# Patient Record
Sex: FEMALE | Race: WHITE | Employment: FULL TIME | URBAN - METROPOLITAN AREA
[De-identification: names, ages, dates, MRNs, and addresses within clinical notes are randomized per-mention and may not be internally consistent; named-entity substitution may affect disease eponyms.]

---

## 2017-05-21 ENCOUNTER — HOSPITAL ENCOUNTER (EMERGENCY)
Age: 42
Discharge: HOME | End: 2017-05-21

## 2017-05-21 VITALS
TEMPERATURE: 98.8 F | HEIGHT: 63 IN | WEIGHT: 155 LBS | SYSTOLIC BLOOD PRESSURE: 157 MMHG | HEART RATE: 74 BPM | DIASTOLIC BLOOD PRESSURE: 90 MMHG | BODY MASS INDEX: 27.46 KG/M2 | OXYGEN SATURATION: 97 % | RESPIRATION RATE: 17 BRPM

## 2017-05-21 DIAGNOSIS — J01.00 ACUTE NON-RECURRENT MAXILLARY SINUSITIS: Primary | ICD-10-CM

## 2017-05-21 PROCEDURE — 6370000000 HC RX 637 (ALT 250 FOR IP): Performed by: PHYSICIAN ASSISTANT

## 2017-05-21 PROCEDURE — 99282 EMERGENCY DEPT VISIT SF MDM: CPT

## 2017-05-21 RX ORDER — AMOXICILLIN AND CLAVULANATE POTASSIUM 875; 125 MG/1; MG/1
1 TABLET, FILM COATED ORAL ONCE
Status: COMPLETED | OUTPATIENT
Start: 2017-05-21 | End: 2017-05-21

## 2017-05-21 RX ORDER — AMOXICILLIN AND CLAVULANATE POTASSIUM 875; 125 MG/1; MG/1
1 TABLET, FILM COATED ORAL 2 TIMES DAILY
Qty: 20 TABLET | Refills: 0 | Status: SHIPPED | OUTPATIENT
Start: 2017-05-21 | End: 2017-05-31

## 2017-05-21 RX ADMIN — AMOXICILLIN AND CLAVULANATE POTASSIUM 1 TABLET: 875; 125 TABLET, FILM COATED ORAL at 15:34

## 2017-05-21 ASSESSMENT — ENCOUNTER SYMPTOMS
APNEA: 0
EYE PAIN: 0
ALLERGIC/IMMUNOLOGIC NEGATIVE: 1
COLOR CHANGE: 0
SHORTNESS OF BREATH: 0
ABDOMINAL PAIN: 0
TROUBLE SWALLOWING: 0

## 2017-10-24 ENCOUNTER — HOSPITAL ENCOUNTER (EMERGENCY)
Age: 42
Discharge: HOME | End: 2017-10-24

## 2017-10-24 ENCOUNTER — APPOINTMENT (OUTPATIENT)
Dept: GENERAL RADIOLOGY | Age: 42
End: 2017-10-24

## 2017-10-24 ENCOUNTER — APPOINTMENT (OUTPATIENT)
Dept: ULTRASOUND IMAGING | Age: 42
End: 2017-10-24

## 2017-10-24 VITALS
TEMPERATURE: 98.4 F | DIASTOLIC BLOOD PRESSURE: 88 MMHG | HEART RATE: 55 BPM | OXYGEN SATURATION: 100 % | SYSTOLIC BLOOD PRESSURE: 151 MMHG | BODY MASS INDEX: 25.34 KG/M2 | RESPIRATION RATE: 12 BRPM | HEIGHT: 63 IN | WEIGHT: 143 LBS

## 2017-10-24 DIAGNOSIS — S39.012A BACK STRAIN, INITIAL ENCOUNTER: Primary | ICD-10-CM

## 2017-10-24 DIAGNOSIS — N39.0 URINARY TRACT INFECTION WITHOUT HEMATURIA, SITE UNSPECIFIED: ICD-10-CM

## 2017-10-24 LAB
ALBUMIN SERPL-MCNC: 4.1 G/DL (ref 3.9–4.9)
ALP BLD-CCNC: 65 U/L (ref 40–130)
ALT SERPL-CCNC: 13 U/L (ref 0–33)
ANION GAP SERPL CALCULATED.3IONS-SCNC: 12 MEQ/L (ref 7–13)
AST SERPL-CCNC: 11 U/L (ref 0–35)
BILIRUB SERPL-MCNC: 0.1 MG/DL (ref 0–1.2)
BILIRUBIN URINE: NEGATIVE
BLOOD, URINE: ABNORMAL
BUN BLDV-MCNC: 13 MG/DL (ref 6–20)
CALCIUM SERPL-MCNC: 9 MG/DL (ref 8.6–10.2)
CHLORIDE BLD-SCNC: 106 MEQ/L (ref 98–107)
CLARITY: CLEAR
CO2: 21 MEQ/L (ref 22–29)
COLOR: YELLOW
CREAT SERPL-MCNC: 0.54 MG/DL (ref 0.5–0.9)
EPITHELIAL CELLS, UA: ABNORMAL /HPF
GFR AFRICAN AMERICAN: >60
GFR NON-AFRICAN AMERICAN: >60
GLOBULIN: 2.7 G/DL (ref 2.3–3.5)
GLUCOSE BLD-MCNC: 91 MG/DL (ref 74–109)
GLUCOSE URINE: NEGATIVE MG/DL
HCT VFR BLD CALC: 41.1 % (ref 37–47)
HEMOGLOBIN: 14.1 G/DL (ref 12–16)
KETONES, URINE: NEGATIVE MG/DL
LEUKOCYTE ESTERASE, URINE: ABNORMAL
LIPASE: 50 U/L (ref 13–60)
MCH RBC QN AUTO: 33.2 PG (ref 27–31.3)
MCHC RBC AUTO-ENTMCNC: 34.2 % (ref 33–37)
MCV RBC AUTO: 97.1 FL (ref 82–100)
MUCUS: PRESENT
NITRITE, URINE: NEGATIVE
PDW BLD-RTO: 12.6 % (ref 11.5–14.5)
PH UA: 5.5 (ref 5–9)
PLATELET # BLD: 271 K/UL (ref 130–400)
POTASSIUM SERPL-SCNC: 4.3 MEQ/L (ref 3.5–5.1)
PROTEIN UA: NEGATIVE MG/DL
RBC # BLD: 4.23 M/UL (ref 4.2–5.4)
RBC UA: ABNORMAL /HPF (ref 0–2)
SODIUM BLD-SCNC: 139 MEQ/L (ref 132–144)
SPECIFIC GRAVITY UA: 1.02 (ref 1–1.03)
TOTAL PROTEIN: 6.8 G/DL (ref 6.4–8.1)
URINE REFLEX TO CULTURE: YES
UROBILINOGEN, URINE: 0.2 E.U./DL
WBC # BLD: 8.2 K/UL (ref 4.8–10.8)
WBC UA: ABNORMAL /HPF (ref 0–5)

## 2017-10-24 PROCEDURE — 72110 X-RAY EXAM L-2 SPINE 4/>VWS: CPT

## 2017-10-24 PROCEDURE — 85027 COMPLETE CBC AUTOMATED: CPT

## 2017-10-24 PROCEDURE — 76705 ECHO EXAM OF ABDOMEN: CPT

## 2017-10-24 PROCEDURE — 80053 COMPREHEN METABOLIC PANEL: CPT

## 2017-10-24 PROCEDURE — 83690 ASSAY OF LIPASE: CPT

## 2017-10-24 PROCEDURE — 87086 URINE CULTURE/COLONY COUNT: CPT

## 2017-10-24 PROCEDURE — 96375 TX/PRO/DX INJ NEW DRUG ADDON: CPT

## 2017-10-24 PROCEDURE — 6360000002 HC RX W HCPCS: Performed by: PHYSICIAN ASSISTANT

## 2017-10-24 PROCEDURE — 96374 THER/PROPH/DIAG INJ IV PUSH: CPT

## 2017-10-24 PROCEDURE — 99284 EMERGENCY DEPT VISIT MOD MDM: CPT

## 2017-10-24 PROCEDURE — 81001 URINALYSIS AUTO W/SCOPE: CPT

## 2017-10-24 PROCEDURE — 36415 COLL VENOUS BLD VENIPUNCTURE: CPT

## 2017-10-24 RX ORDER — NITROFURANTOIN 25; 75 MG/1; MG/1
100 CAPSULE ORAL 2 TIMES DAILY
Qty: 10 CAPSULE | Refills: 0 | Status: SHIPPED | OUTPATIENT
Start: 2017-10-24 | End: 2017-10-29

## 2017-10-24 RX ORDER — KETOROLAC TROMETHAMINE 30 MG/ML
30 INJECTION, SOLUTION INTRAMUSCULAR; INTRAVENOUS ONCE
Status: COMPLETED | OUTPATIENT
Start: 2017-10-24 | End: 2017-10-24

## 2017-10-24 RX ORDER — CYCLOBENZAPRINE HCL 10 MG
10 TABLET ORAL 3 TIMES DAILY PRN
Qty: 21 TABLET | Refills: 0 | Status: SHIPPED | OUTPATIENT
Start: 2017-10-24

## 2017-10-24 RX ORDER — ONDANSETRON 2 MG/ML
4 INJECTION INTRAMUSCULAR; INTRAVENOUS ONCE
Status: COMPLETED | OUTPATIENT
Start: 2017-10-24 | End: 2017-10-24

## 2017-10-24 RX ORDER — NAPROXEN 500 MG/1
500 TABLET ORAL 2 TIMES DAILY WITH MEALS
Qty: 30 TABLET | Refills: 0 | Status: SHIPPED | OUTPATIENT
Start: 2017-10-24

## 2017-10-24 RX ADMIN — ONDANSETRON 4 MG: 2 INJECTION INTRAMUSCULAR; INTRAVENOUS at 08:33

## 2017-10-24 RX ADMIN — KETOROLAC TROMETHAMINE 30 MG: 30 INJECTION, SOLUTION INTRAMUSCULAR at 08:33

## 2017-10-24 ASSESSMENT — ENCOUNTER SYMPTOMS
VOMITING: 0
RHINORRHEA: 0
NAUSEA: 1
ABDOMINAL PAIN: 1
CONSTIPATION: 0
COUGH: 0
EYE DISCHARGE: 0
DIARRHEA: 0
BACK PAIN: 1
COLOR CHANGE: 0
CHOKING: 0
WHEEZING: 0
STRIDOR: 0
ABDOMINAL DISTENTION: 0
SHORTNESS OF BREATH: 0
SORE THROAT: 0

## 2017-10-24 ASSESSMENT — PAIN DESCRIPTION - ORIENTATION: ORIENTATION: LOWER;RIGHT

## 2017-10-24 ASSESSMENT — PAIN DESCRIPTION - DESCRIPTORS: DESCRIPTORS: CRAMPING

## 2017-10-24 ASSESSMENT — PAIN SCALES - GENERAL
PAINLEVEL_OUTOF10: 4
PAINLEVEL_OUTOF10: 5
PAINLEVEL_OUTOF10: 0

## 2017-10-24 ASSESSMENT — PAIN SCALES - WONG BAKER: WONGBAKER_NUMERICALRESPONSE: 0

## 2017-10-24 ASSESSMENT — PAIN DESCRIPTION - LOCATION: LOCATION: ABDOMEN;BACK

## 2017-10-24 ASSESSMENT — PAIN DESCRIPTION - PAIN TYPE: TYPE: ACUTE PAIN

## 2017-10-24 NOTE — ED PROVIDER NOTES
[facility] ED  eMERGENCY dEPARTMENT eNCOUnter      Pt Name: Julianne Schmidt  MRN: [id number]  [place] 1975  Date of evaluation: 10/24/2017  Provider: Elan Lagunas PA-C    CHIEF COMPLAINT       Chief Complaint   Patient presents with    Back Pain     lower back and right sided lower abd pain since last night, no BM since friday, N/V this morning at 7am, Hx of cholitis 5 years ago         HISTORY OF PRESENT ILLNESS   (Location/Symptom, Timing/Onset, Context/Setting, Quality, Duration, Modifying Factors, Severity)  Note limiting factors. Julianne Schmidt is a 46 y.o. female who presents to the emergency department With a complaint of right upper quadrant abdominal pain as well as bilateral paraspinal lumbar pain which patient states been ongoing since early this morning. She states she has not had any bowel movement in the last 3 or 4 days which is unusual for her she did complain of some increased pain yesterday after eating dinner with her last meal being at 7 PM last night. She denies any acute injury no numbness or tingling or difficulty with ambulation. She during her current pain as a 5 out of 10. HPI    Nursing Notes were reviewed. REVIEW OF SYSTEMS    (2-9 systems for level 4, 10 or more for level 5)     Review of Systems   Constitutional: Negative for activity change, appetite change, chills, fatigue, fever and unexpected weight change. HENT: Negative for congestion, ear discharge, ear pain, nosebleeds, rhinorrhea and sore throat. Eyes: Negative for discharge. Respiratory: Negative for cough, choking, shortness of breath, wheezing and stridor. Cardiovascular: Negative for chest pain, palpitations and leg swelling. Gastrointestinal: Positive for abdominal pain and nausea. Negative for abdominal distention, constipation, diarrhea and vomiting. Genitourinary: Negative for difficulty urinating, dysuria, flank pain and frequency.    Musculoskeletal: Positive for back pain. Negative for arthralgias, gait problem, joint swelling, myalgias, neck pain and neck stiffness. Skin: Negative for color change, pallor, rash and wound. Neurological: Negative for dizziness, tremors, syncope, weakness, numbness and headaches. Psychiatric/Behavioral: Negative for agitation and confusion. Except as noted above the remainder of the review of systems was reviewed and negative. PAST MEDICAL HISTORY   History reviewed. No pertinent past medical history. SURGICAL HISTORY       Past Surgical History:   Procedure Laterality Date    TUBAL LIGATION           CURRENT MEDICATIONS       Previous Medications    No medications on file       ALLERGIES     Bee venom    FAMILY HISTORY     History reviewed. No pertinent family history. SOCIAL HISTORY       Social History     Social History    Marital status: Single     Spouse name: N/A    Number of children: N/A    Years of education: N/A     Social History Main Topics    Smoking status: Current Every Day Smoker     Packs/day: 0.50     Years: 20.00     Types: Cigarettes    Smokeless tobacco: Never Used    Alcohol use Yes      Comment: occasional    Drug use:      Types: Marijuana    Sexual activity: Not Asked     Other Topics Concern    None     Social History Narrative    None       SCREENINGS             PHYSICAL EXAM    (up to 7 for level 4, 8 or more for level 5)     ED Triage Vitals [10/24/17 0802]   BP Temp Temp Source Pulse Resp SpO2 Height Weight   (!) 151/87 98.4 °F (36.9 °C) Oral 70 16 99 % 5' 3\" (1.6 m) 143 lb (64.9 kg)       Physical Exam   Constitutional: She is oriented to person, place, and time. She appears well-developed and well-nourished. HENT:   Head: Normocephalic. Eyes: Pupils are equal, round, and reactive to light. Neck: Normal range of motion. Neck supple. No JVD present. No tracheal deviation present. Cardiovascular: Normal rate. Pulmonary/Chest: Effort normal. No respiratory distress. She has no wheezes. She has no rales. She exhibits no tenderness. Abdominal: Soft. She exhibits no distension and no mass. There is tenderness. There is no guarding. Tenderness examination to right upper quadrant. No guarding mass or rebound. Musculoskeletal: She exhibits no edema or deformity. No CVA tenderness no pain palpation to lumbar spine, no crepitus no step-offs   Neurological: She is alert and oriented to person, place, and time. Coordination normal.   Skin: Skin is warm and dry. Psychiatric: She has a normal mood and affect. DIAGNOSTIC RESULTS     EKG: All EKG's are interpreted by the Emergency Department Physician who either signs or Co-signs this chart in the absence of a cardiologist.        RADIOLOGY:   Non-plain film images such as CT, Ultrasound and MRI are read by the radiologist. [facility] radiographic images are visualized and preliminarily interpreted by the emergency physician with the below findings:    X-ray lumbar spine shows no acute fractures or subluxation there is moderate amount of constipation within the bowel. Interpretation per the Radiologist below, if available at the time of this note:    US ABDOMINAL LIMITED   Final Result      Probable left hepatic cavernous hemangioma.             XR LUMBAR SPINE (MIN 4 VIEWS)   Final Result            ED BEDSIDE ULTRASOUND:   Performed by ED Physician - none    LABS:  Labs Reviewed   COMPREHENSIVE METABOLIC PANEL - Abnormal; Notable for the following:        Result Value    CO2 21 (*)     All other components within normal limits   CBC - Abnormal; Notable for the following:     MCH 33.2 (*)     All other components within normal limits   URINE RT REFLEX TO CULTURE - Abnormal; Notable for the following:     Blood, Urine LARGE (*)     Leukocyte Esterase, Urine MODERATE (*)     All other components within normal limits   MICROSCOPIC URINALYSIS - Abnormal; Notable for the following:     WBC, UA 10-20 (*)     RBC, UA  (*) All other components within normal limits   URINE CULTURE   LIPASE       All other labs were within normal range or not returned as of this dictation. EMERGENCY DEPARTMENT COURSE and DIFFERENTIAL DIAGNOSIS/MDM:   Vitals:    Vitals:    10/24/17 0802 10/24/17 0950   BP: (!) 151/87 (!) 151/88   Pulse: 70 55   Resp: 16 12   Temp: 98.4 °F (36.9 °C)    TempSrc: Oral    SpO2: 99% 100%   Weight: 143 lb (64.9 kg)    Height: 5' 3\" (1.6 m)          ED Course      MDM  Number of Diagnoses or Management Options  Back strain, initial encounter:   Urinary tract infection without hematuria, site unspecified:   Diagnosis management comments: Patient uncomfortable during her visit here x-ray lumbar spine shows no acute fracture subluxation no acute degenerative changes are noted. A consult was completed due to right upper quadrant abdominal pain which shows no acute signs of cholelithiasis. Urinalysis shows signs of urinary tract infection at this time she'll be treated as a lumbar sprain and also put on antibiotics for urinary tract infection and advised to follow up with her regular family physician in the next 48 hours. CRITICAL CARE TIME   Total Critical Care time was 0 minutes, excluding separately reportable procedures. There was a high probability of clinically significant/life threatening deterioration in the patient's condition which required my urgent intervention. CONSULTS:  None    PROCEDURES:  Unless otherwise noted below, none     Procedures    FINAL IMPRESSION      1. Back strain, initial encounter    2.  Urinary tract infection without hematuria, site unspecified          DISPOSITION/PLAN   DISPOSITION Decision to Discharge    PATIENT REFERRED TO:  Lane Sanchez MD  [address]  [phone]    In 2 days        DISCHARGE MEDICATIONS:  New Prescriptions    CYCLOBENZAPRINE (FLEXERIL) 10 MG TABLET    Take 1 tablet by mouth 3 times daily as needed for Muscle spasms NAPROXEN (NAPROSYN) 500 MG TABLET    Take 1 tablet by mouth 2 times daily (with meals)    NITROFURANTOIN, MACROCRYSTAL-MONOHYDRATE, (MACROBID) 100 MG CAPSULE    Take 1 capsule by mouth 2 times daily for 5 days     Attestation: The Prescription Monitoring Report for this patient was reviewed today.  (Flash Blanchard PA-C)    (Please note that portions of this note were completed with a voice recognition program.  Efforts were made to edit the dictations but occasionally words are mis-transcribed.)    Flash Blanchard PA-C (electronically signed)  Attending Emergency Physician         Flash Blanchard PA-C  10/24/17 1652 [facility] Tina Grady PA-C  10/24/17 1224 [address] [place] [address] Zenaidamargarita Grady PA-C  10/24/17 1224 [address] [place] [address] Zenaidamargarita Grady PA-C  10/24/17 1013

## 2017-10-24 NOTE — ED NOTES
Pt reports intermittent right sided abdominal and back pain that is cramping in nature. States woke her from sleep last night. Resps even and non labored. Skin p/w/d. +2 radials. Mcuous membranes pink and moist. Cap refill < 3 sec. DUMONT x 4. Aware of plan of care. Awaits US.      Carolyne Dominguez RN  10/24/17 2243

## 2017-10-25 LAB — URINE CULTURE, ROUTINE: NORMAL

## 2019-09-05 ENCOUNTER — HOSPITAL ENCOUNTER (EMERGENCY)
Age: 44
Discharge: HOME | End: 2019-09-05

## 2019-09-05 VITALS
HEART RATE: 62 BPM | DIASTOLIC BLOOD PRESSURE: 57 MMHG | TEMPERATURE: 98.2 F | OXYGEN SATURATION: 100 % | BODY MASS INDEX: 28.16 KG/M2 | HEIGHT: 62 IN | WEIGHT: 153 LBS | RESPIRATION RATE: 18 BRPM | SYSTOLIC BLOOD PRESSURE: 119 MMHG

## 2019-09-05 DIAGNOSIS — T78.40XA ALLERGIC STATE, INITIAL ENCOUNTER: Primary | ICD-10-CM

## 2019-09-05 PROCEDURE — 99282 EMERGENCY DEPT VISIT SF MDM: CPT

## 2019-09-05 RX ORDER — LORATADINE 10 MG/1
10 TABLET ORAL DAILY
Qty: 7 TABLET | Refills: 0 | Status: SHIPPED | OUTPATIENT
Start: 2019-09-05 | End: 2019-09-12

## 2019-09-06 ASSESSMENT — ENCOUNTER SYMPTOMS
EYE DISCHARGE: 1
CONSTIPATION: 0
SORE THROAT: 0
EYE REDNESS: 1
COLOR CHANGE: 0
EYE ITCHING: 1
RHINORRHEA: 0
ABDOMINAL DISTENTION: 0
ABDOMINAL PAIN: 0
SHORTNESS OF BREATH: 0

## 2019-09-06 NOTE — ED PROVIDER NOTES
1500]   BP Temp Temp Source Pulse Resp SpO2 Height Weight   (!) 119/57 98.2 °F (36.8 °C) Oral 62 18 100 % 5' 2\" (1.575 m) 153 lb (69.4 kg)       Physical Exam   Constitutional: She is oriented to person, place, and time. She appears well-developed and well-nourished. HENT:   Head: Normocephalic. Eyes: Pupils are equal, round, and reactive to light. EOM are normal.   Bilateral scleral injection. No discharge no drainage no signs of corneal injury. Neck: Normal range of motion. Neck supple. No JVD present. No tracheal deviation present. Cardiovascular: Normal rate. Pulmonary/Chest: Effort normal and breath sounds normal. No respiratory distress. She has no wheezes. She has no rales. She exhibits no tenderness. Abdominal: Soft. Bowel sounds are normal. She exhibits no distension and no mass. There is no tenderness. There is no guarding. Musculoskeletal: Normal range of motion. She exhibits no edema or deformity. Neurological: She is alert and oriented to person, place, and time. Coordination normal.   Skin: Skin is warm. Psychiatric: She has a normal mood and affect. DIAGNOSTIC RESULTS     EKG: All EKG's are interpreted by the Emergency Department Physician who either signs or Co-signsthis chart in the absence of a cardiologist.        RADIOLOGY:   Luis Chichi such as CT, Ultrasound and MRI are read by the radiologist. Plain radiographic images are visualized and preliminarily interpreted by the emergency physician with the below findings:        Interpretation per the Radiologist below, if available at the time ofthis note:    No orders to display         ED BEDSIDE ULTRASOUND:   Performed by ED Physician - none    LABS:  Labs Reviewed - No data to display    All other labs were within normal range or not returned as of this dictation.     EMERGENCY DEPARTMENT COURSE and DIFFERENTIAL DIAGNOSIS/MDM:   Vitals:    Vitals:    09/05/19 1500   BP: (!) 119/57   Pulse: 62   Resp: 18   Temp: 98.2 °F (36.8 °C)   TempSrc: Oral   SpO2: 100%   Weight: 153 lb (69.4 kg)   Height: 5' 2\" (1.575 m)            MDM  Number of Diagnoses or Management Options  Allergic state, initial encounter:   Diagnosis management comments: She has irritation bilateral eyes which patient states started this morning for her she does have some scleral injection, symptoms are most consistent with that of environmental allergies. She was given a prescription for Claritin and advised to follow-up with her regular family physician next 2 to 3 days if she has any continued or worsening symptoms. She was also advised to return to the emergency department if she has any worsening or change in symptoms. CRITICAL CARE TIME   Total Critical Care time was 0 minutes, excluding separately reportableprocedures. There was a high probability of clinicallysignificant/life threatening deterioration in the patient's condition which required my urgent intervention. CONSULTS:  None    PROCEDURES:  Unless otherwise noted below, none     Procedures    FINAL IMPRESSION      1.  Allergic state, initial encounter          DISPOSITION/PLAN   DISPOSITION Decision To Discharge 09/05/2019 03:20:46 PM      PATIENT REFERRED TO:  [facility]  [address] [place] [address]  [phone]  In 3 days        DISCHARGE MEDICATIONS:  Discharge Medication List as of 9/5/2019  3:22 PM      START taking these medications    Details   loratadine (CLARITIN) 10 MG tablet Take 1 tablet by mouth daily for 7 days, Disp-7 tablet, R-0Print                (Please note that portions of this note were completed with a voice recognition program.  Efforts were made to edit the dictations but occasionally words are mis-transcribed.)    Carmen Burton PA-C (electronically signed)  Attending Emergency Physician         Carmen Burton PA-C  09/06/19 6439

## 2021-12-30 ENCOUNTER — VIRTUAL VISIT (OUTPATIENT)
Dept: FAMILY MEDICINE CLINIC | Age: 46
End: 2021-12-30

## 2021-12-30 DIAGNOSIS — Z20.822 ENCOUNTER BY TELEHEALTH FOR SUSPECTED COVID-19: Primary | ICD-10-CM

## 2021-12-30 PROCEDURE — 99213 OFFICE O/P EST LOW 20 MIN: CPT | Performed by: PHYSICIAN ASSISTANT

## 2021-12-30 PROCEDURE — 99000 SPECIMEN HANDLING OFFICE-LAB: CPT | Performed by: PHYSICIAN ASSISTANT

## 2021-12-30 SDOH — ECONOMIC STABILITY: FOOD INSECURITY: WITHIN THE PAST 12 MONTHS, THE FOOD YOU BOUGHT JUST DIDN'T LAST AND YOU DIDN'T HAVE MONEY TO GET MORE.: NEVER TRUE

## 2021-12-30 SDOH — ECONOMIC STABILITY: FOOD INSECURITY: WITHIN THE PAST 12 MONTHS, YOU WORRIED THAT YOUR FOOD WOULD RUN OUT BEFORE YOU GOT MONEY TO BUY MORE.: NEVER TRUE

## 2021-12-30 ASSESSMENT — PATIENT HEALTH QUESTIONNAIRE - PHQ9
SUM OF ALL RESPONSES TO PHQ QUESTIONS 1-9: 0
2. FEELING DOWN, DEPRESSED OR HOPELESS: 0
SUM OF ALL RESPONSES TO PHQ9 QUESTIONS 1 & 2: 0
SUM OF ALL RESPONSES TO PHQ QUESTIONS 1-9: 0
1. LITTLE INTEREST OR PLEASURE IN DOING THINGS: 0
SUM OF ALL RESPONSES TO PHQ QUESTIONS 1-9: 0

## 2021-12-30 ASSESSMENT — ENCOUNTER SYMPTOMS
VOMITING: 0
SINUS PRESSURE: 0
BACK PAIN: 0
DIARRHEA: 0
SORE THROAT: 1
SHORTNESS OF BREATH: 0
NAUSEA: 0
SINUS PAIN: 0
CHEST TIGHTNESS: 0
ABDOMINAL PAIN: 0
COUGH: 0

## 2021-12-30 ASSESSMENT — SOCIAL DETERMINANTS OF HEALTH (SDOH): HOW HARD IS IT FOR YOU TO PAY FOR THE VERY BASICS LIKE FOOD, HOUSING, MEDICAL CARE, AND HEATING?: NOT VERY HARD

## 2021-12-30 NOTE — PROGRESS NOTES
2021    TELEHEALTH EVALUATION -- Audio/Visual (During [id number] public health emergency)    Due to COVID 19 outbreak, patient's office visit was converted to a virtual visit. Patient was contacted and agreed to proceed with a virtual visit via [email or web address] me  The risks and benefits of converting to a virtual visit were discussed in light of the current infectious disease epidemic. Patient also understood that insurance coverage and co-pays are up to their individual insurance plans. HPI:    Mario Mcgrath (:  1975) has requested an audio/video evaluation for the following concern(s):    Patient is presenting today with CC of COVID-19 symptoms that started today. Fevers/Chills- 101F. Patient took tylenol this morning. Congestion  Sore throat- Still eating and drinking. Body aches/headahes    Patient's sister tested positive COVID-19. 21. Review of Systems   Constitutional: Positive for chills and fever. Negative for activity change and appetite change. HENT: Positive for sore throat. Negative for congestion, drooling, sinus pressure and sinus pain. Eyes: Negative for visual disturbance. Respiratory: Negative for cough, chest tightness and shortness of breath. Cardiovascular: Negative for chest pain. Gastrointestinal: Negative for abdominal pain, diarrhea, nausea and vomiting. Endocrine: Negative for cold intolerance. Genitourinary: Negative for dysuria, flank pain, frequency and hematuria. Musculoskeletal: Positive for myalgias. Negative for arthralgias and back pain. Skin: Negative for rash. Allergic/Immunologic: Negative for food allergies. Neurological: Positive for headaches. Negative for weakness, light-headedness and numbness. Hematological: Does not bruise/bleed easily. Prior to Visit Medications    Medication Sig Taking?  Authorizing Provider   naproxen (NAPROSYN) 500 MG tablet Take 1 tablet by mouth 2 times daily (with meals)  Patient not taking: to authenticate this note. --GREG Dawson on 12/30/2021 at 12:14 PM        Pursuant to the emergency declaration under the [address] [place] [address], [facility] waiver authority and the [employer] Act, this Virtual  Visit was conducted, with patient's consent, to reduce the patient's risk of exposure to COVID-19 and provide continuity of care for an established patient. Services were provided through a video synchronous discussion virtually to substitute for in-person clinic visit.

## 2022-06-14 ENCOUNTER — HOSPITAL ENCOUNTER (EMERGENCY)
Age: 47
Discharge: HOME | End: 2022-06-14
Attending: EMERGENCY MEDICINE
Payer: COMMERCIAL

## 2022-06-14 VITALS
BODY MASS INDEX: 25.69 KG/M2 | RESPIRATION RATE: 18 BRPM | SYSTOLIC BLOOD PRESSURE: 125 MMHG | WEIGHT: 145 LBS | HEIGHT: 63 IN | TEMPERATURE: 97.3 F | DIASTOLIC BLOOD PRESSURE: 80 MMHG | HEART RATE: 81 BPM | OXYGEN SATURATION: 98 %

## 2022-06-14 DIAGNOSIS — S61.012A LACERATION OF LEFT THUMB WITHOUT FOREIGN BODY WITHOUT DAMAGE TO NAIL, INITIAL ENCOUNTER: Primary | ICD-10-CM

## 2022-06-14 PROCEDURE — 12001 RPR S/N/AX/GEN/TRNK 2.5CM/<: CPT

## 2022-06-14 PROCEDURE — 6360000002 HC RX W HCPCS: Performed by: EMERGENCY MEDICINE

## 2022-06-14 PROCEDURE — 6370000000 HC RX 637 (ALT 250 FOR IP): Performed by: EMERGENCY MEDICINE

## 2022-06-14 PROCEDURE — 90715 TDAP VACCINE 7 YRS/> IM: CPT | Performed by: EMERGENCY MEDICINE

## 2022-06-14 PROCEDURE — 90471 IMMUNIZATION ADMIN: CPT | Performed by: EMERGENCY MEDICINE

## 2022-06-14 PROCEDURE — 99284 EMERGENCY DEPT VISIT MOD MDM: CPT

## 2022-06-14 RX ORDER — NAPROXEN 500 MG/1
500 TABLET ORAL ONCE
Status: COMPLETED | OUTPATIENT
Start: 2022-06-14 | End: 2022-06-14

## 2022-06-14 RX ORDER — CEPHALEXIN 500 MG/1
500 CAPSULE ORAL ONCE
Status: COMPLETED | OUTPATIENT
Start: 2022-06-14 | End: 2022-06-14

## 2022-06-14 RX ORDER — CEPHALEXIN 250 MG/1
500 CAPSULE ORAL 2 TIMES DAILY
Qty: 20 CAPSULE | Refills: 0 | Status: SHIPPED | OUTPATIENT
Start: 2022-06-14 | End: 2022-06-19

## 2022-06-14 RX ADMIN — TETANUS TOXOID, REDUCED DIPHTHERIA TOXOID AND ACELLULAR PERTUSSIS VACCINE, ADSORBED 0.5 ML: 5; 2.5; 8; 8; 2.5 SUSPENSION INTRAMUSCULAR at 07:08

## 2022-06-14 RX ADMIN — NAPROXEN 500 MG: 500 TABLET ORAL at 07:07

## 2022-06-14 RX ADMIN — CEPHALEXIN 500 MG: 500 CAPSULE ORAL at 07:08

## 2022-06-14 ASSESSMENT — PAIN SCALES - GENERAL
PAINLEVEL_OUTOF10: 5
PAINLEVEL_OUTOF10: 2

## 2022-06-14 ASSESSMENT — PAIN DESCRIPTION - LOCATION: LOCATION: FINGER (COMMENT WHICH ONE)

## 2022-06-14 ASSESSMENT — PAIN DESCRIPTION - ORIENTATION: ORIENTATION: LEFT

## 2022-06-14 ASSESSMENT — PAIN DESCRIPTION - DESCRIPTORS: DESCRIPTORS: SHARP

## 2022-06-14 ASSESSMENT — PAIN - FUNCTIONAL ASSESSMENT: PAIN_FUNCTIONAL_ASSESSMENT: 0-10

## 2022-06-14 NOTE — Clinical Note
Lisette Gray was seen and treated in our emergency department on 6/14/2022. She may return to work on 06/15/2022. If you have any questions or concerns, please don't hesitate to call.       Dean Howe MD

## 2022-06-14 NOTE — Clinical Note
Martha Christianson accompanied Katerina Joiner to the emergency department on 6/14/2022. They may return to work on 06/15/2022. If you have any questions or concerns, please don't hesitate to call.       Gonzalo Steele MD

## 2022-06-14 NOTE — Clinical Note
Tana Starks accompanied Neelam Del Castillo to the emergency department on 6/14/2022. They may return to work on 06/15/2022. If you have any questions or concerns, please don't hesitate to call.       Ayad Day MD

## 2022-06-18 NOTE — ED PROVIDER NOTES
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 PAST MEDICAL HISTORY    History reviewed. No pertinent past medical history. SURGICAL HISTORY    Past Surgical History:   Procedure Laterality Date    TUBAL LIGATION         CURRENT MEDICATIONS    Current Outpatient Rx   Medication Sig Dispense Refill    cephALEXin (KEFLEX) 250 MG capsule Take 2 capsules by mouth 2 times daily for 5 days 20 capsule 0    naproxen (NAPROSYN) 500 MG tablet Take 1 tablet by mouth 2 times daily (with meals) (Patient not taking: Reported on 12/30/2021) 30 tablet 0    cyclobenzaprine (FLEXERIL) 10 MG tablet Take 1 tablet by mouth 3 times daily as needed for Muscle spasms (Patient not taking: Reported on 12/30/2021) 21 tablet 0       ALLERGIES    Allergies   Allergen Reactions    Bee Venom Anaphylaxis       FAMILY HISTORY    History reviewed. No pertinent family history.     SOCIAL HISTORY    Social History     Socioeconomic History    Marital status: Single     Spouse name: None    Number of children: None    Years of education: None    Highest education level: None   Occupational History    None   Tobacco Use    Smoking status: Current Every Day Smoker     Packs/day: 0.50     Years: 20.00     Pack years: 10.00     Types: Cigarettes    Smokeless tobacco: Never Used   Substance and Sexual Activity    Alcohol use: Yes     Comment: occasional    Drug use: Yes     Types: Marijuana Fronie Marvin)    Sexual activity: None   Other Topics Concern    None   Social History Narrative    None     Social Determinants of Health Financial Resource Strain: Low Risk     Difficulty of Paying Living Expenses: Not very hard   Food Insecurity: No Food Insecurity    Worried About Running Out of Food in the Last Year: Never true    Escobar of Food in the Last Year: Never true   Transportation Needs:     Lack of Transportation (Medical): Not on file    Lack of Transportation (Non-Medical):  Not on file   Physical Activity:     Days of Exercise per Week: Not on file    Minutes of Exercise per Session: Not on file   Stress:     Feeling of Stress : Not on file   Social Connections:     Frequency of Communication with Friends and Family: Not on file    Frequency of Social Gatherings with Friends and Family: Not on file    Attends Jain Services: Not on file    Active Member of [address] [place] [address] or Organizations: Not on file    Attends Club or Organization Meetings: Not on file    Marital Status: Not on file   Intimate Partner Violence:     Fear of Current or Ex-Partner: Not on file    Emotionally Abused: Not on file    Physically Abused: Not on file    Sexually Abused: Not on file   Housing Stability:     Unable to Pay for Housing in the Last Year: Not on file    Number of [place] in the Last Year: Not on file    Unstable Housing in the Last Year: Not on file       REVIEW OF SYSTEMS    Constitutional:  Denies fever, chills, weight loss or weakness   Eyes:  Denies photophobia or discharge   HENT:  Denies sore throat or ear pain   Respiratory:  Denies cough or shortness of breath   Cardiovascular:  Denies chest pain, palpitations or swelling   GI:  Denies abdominal pain, nausea, vomiting, or diarrhea   Musculoskeletal: c/o Left thumb laceration , Denies back pain   Skin:  Denies rash   Neurologic:  Denies headache, focal weakness or sensory changes   Endocrine:  Denies polyuria or polydypsia   Lymphatic:  Denies swollen glands   Psychiatric:  Denies depression, suicidal ideation or homicidal ideation   All systems negative except as marked. PHYSICAL EXAM    VITAL SIGNS: /80   Pulse 81   Temp 97.3 °F (36.3 °C) (Temporal)   Resp 18   Ht 5' 3\" (1.6 m)   Wt 145 lb (65.8 kg)   LMP 06/11/2022   SpO2 98%   BMI 25.69 kg/m²    Constitutional:  Well developed, Well nourished, No acute distress, Non-toxic appearance. HENT:  Normocephalic, Atraumatic, Bilateral external ears normal, Oropharynx moist, No oral exudates, Nose normal. Neck- Normal range of motion, No tenderness, Supple, No stridor. Eyes:  PERRL, EOMI, Conjunctiva normal, No discharge. Respiratory:  Normal breath sounds, No respiratory distress, No wheezing, No chest tenderness. Cardiovascular:  Normal heart rate, Normal rhythm, No murmurs, No rubs, No gallops. GI:  Bowel sounds normal, Soft, No tenderness, No masses, No pulsatile masses. :  No CVA tenderness. Musculoskeletal:Left thumb - laceration present 2 cm , no nail injury, no foreign body, bleeding controlled   Intact distal pulses, No edema, No tenderness, No cyanosis, No clubbing. Good range of motion in all major joints. No tenderness to palpation or major deformities noted. Back- No tenderness. Integument:  Warm, Dry, No erythema, No rash. Lymphatic:  No lymphadenopathy noted. Neurologic:  Alert & oriented x 3, Normal motor function, Normal sensory function, No focal deficits noted. Psychiatric:  Affect normal, Judgment normal, Mood normal.         PROCEDURES  Procedure Name: Laceration Repair  Location: Left thumb 2 cm   Pre-Procedure Diagnosis: Laceration  Post-Procedure Diagnosis: Repaired Laceration  Informed consent was obtained before procedure started. PROCEDURE:  The appropriate timeout was taken. The area was prepped and draped in the usual sterile fashion. he wound was copiously irrigated. Glue was  placed. Estimated blood loss was less than 1 mL. A dressing was applied to the area and anticipatory guidance, as well as standard post-procedure care, was explained.  Return precautions are given. The patient tolerated the procedure well without complications. Summation      Patient Course:     ED Medications administered this visit:    Medications   naproxen (NAPROSYN) tablet 500 mg (500 mg Oral Given 6/14/22 0707)   cephALEXin (KEFLEX) capsule 500 mg (500 mg Oral Given 6/14/22 0708)   tetanus-diphth-acell pertussis (BOOSTRIX) injection 0.5 mL (0.5 mLs IntraMUSCular Given 6/14/22 0708)       New Prescriptions from this visit:    Discharge Medication List as of 6/14/2022  7:11 AM      START taking these medications    Details   cephALEXin (KEFLEX) 250 MG capsule Take 2 capsules by mouth 2 times daily for 5 days, Disp-20 capsule, R-0Print             Follow-up:  your PCP    Call in 1 day          Final Impression:   1.  Laceration of left thumb without foreign body without damage to nail, initial encounter               (Please note that portions of this note were completed with a voice recognition program.  Efforts were made to edit the dictations but occasionally words are mis-transcribed.)          Saundra Trujillo MD  06/18/22 1505

## 2022-10-13 ENCOUNTER — HOSPITAL ENCOUNTER (EMERGENCY)
Age: 47
Discharge: HOME | End: 2022-10-13
Payer: COMMERCIAL

## 2022-10-13 VITALS
HEIGHT: 63 IN | RESPIRATION RATE: 18 BRPM | TEMPERATURE: 98.1 F | OXYGEN SATURATION: 97 % | BODY MASS INDEX: 26.05 KG/M2 | DIASTOLIC BLOOD PRESSURE: 90 MMHG | SYSTOLIC BLOOD PRESSURE: 142 MMHG | WEIGHT: 147 LBS | HEART RATE: 72 BPM

## 2022-10-13 DIAGNOSIS — R19.7 DIARRHEA, UNSPECIFIED TYPE: Primary | ICD-10-CM

## 2022-10-13 PROCEDURE — 99283 EMERGENCY DEPT VISIT LOW MDM: CPT

## 2022-10-13 PROCEDURE — 6370000000 HC RX 637 (ALT 250 FOR IP): Performed by: PHYSICIAN ASSISTANT

## 2022-10-13 RX ORDER — ONDANSETRON 4 MG/1
4-8 TABLET, ORALLY DISINTEGRATING ORAL EVERY 12 HOURS PRN
Qty: 12 TABLET | Refills: 0 | Status: SHIPPED | OUTPATIENT
Start: 2022-10-13

## 2022-10-13 RX ORDER — LOPERAMIDE HYDROCHLORIDE 2 MG/1
2 CAPSULE ORAL 4 TIMES DAILY PRN
Qty: 20 CAPSULE | Refills: 0 | Status: SHIPPED | OUTPATIENT
Start: 2022-10-13 | End: 2022-10-23

## 2022-10-13 RX ORDER — ONDANSETRON 4 MG/1
4 TABLET, ORALLY DISINTEGRATING ORAL ONCE
Status: COMPLETED | OUTPATIENT
Start: 2022-10-13 | End: 2022-10-13

## 2022-10-13 RX ORDER — LOPERAMIDE HYDROCHLORIDE 2 MG/1
4 CAPSULE ORAL ONCE
Status: COMPLETED | OUTPATIENT
Start: 2022-10-13 | End: 2022-10-13

## 2022-10-13 RX ADMIN — LOPERAMIDE HYDROCHLORIDE 4 MG: 2 CAPSULE ORAL at 06:45

## 2022-10-13 RX ADMIN — ONDANSETRON 4 MG: 4 TABLET, ORALLY DISINTEGRATING ORAL at 06:45

## 2022-10-13 ASSESSMENT — ENCOUNTER SYMPTOMS
COUGH: 0
PHOTOPHOBIA: 0
EYE PAIN: 0
NAUSEA: 0
SORE THROAT: 0
VOMITING: 0
SHORTNESS OF BREATH: 0
ABDOMINAL PAIN: 0
RHINORRHEA: 0
BACK PAIN: 0
DIARRHEA: 1

## 2022-10-13 ASSESSMENT — PAIN DESCRIPTION - PAIN TYPE: TYPE: ACUTE PAIN

## 2022-10-13 ASSESSMENT — PAIN DESCRIPTION - LOCATION: LOCATION: ABDOMEN

## 2022-10-13 ASSESSMENT — PAIN DESCRIPTION - FREQUENCY: FREQUENCY: INTERMITTENT

## 2022-10-13 ASSESSMENT — PAIN - FUNCTIONAL ASSESSMENT: PAIN_FUNCTIONAL_ASSESSMENT: 0-10

## 2022-10-13 ASSESSMENT — PAIN SCALES - GENERAL: PAINLEVEL_OUTOF10: 3

## 2022-10-13 ASSESSMENT — PAIN DESCRIPTION - DESCRIPTORS: DESCRIPTORS: CRAMPING

## 2022-10-13 NOTE — Clinical Note
Becka Smith was seen and treated in our emergency department on 10/13/2022. She may return to work on 10/15/2022. If you have any questions or concerns, please don't hesitate to call.       Nancye Castleman, [place]

## 2022-10-13 NOTE — ED PROVIDER NOTES
[address] [facility] ED  EMERGENCY DEPARTMENT ENCOUNTER      Pt Name: Lawyer Kerr  MRN: [id number]  [place] 1975  Date of evaluation: 10/13/2022  Provider: Tyrese Curry PA-C      HISTORY OF PRESENT ILLNESS    Lawyer Kerr is a 42 y.o. female who presents to the Emergency Department with diarrhea x2 days with abdominal cramping yesterday. She does not have any pain at this time. Her significant other is here with being seen with similar symptoms. She denies any fevers nausea vomiting. REVIEW OF SYSTEMS       Review of Systems   Constitutional:  Negative for chills, diaphoresis, fatigue and fever. HENT:  Negative for congestion, rhinorrhea and sore throat. Eyes:  Negative for photophobia and pain. Respiratory:  Negative for cough and shortness of breath. Cardiovascular:  Negative for chest pain and palpitations. Gastrointestinal:  Positive for diarrhea. Negative for abdominal pain, nausea and vomiting. Genitourinary:  Negative for dysuria and flank pain. Musculoskeletal:  Negative for back pain. Skin:  Negative for rash. Neurological:  Negative for dizziness, light-headedness and headaches. Psychiatric/Behavioral: Negative. All other systems reviewed and are negative. PAST MEDICAL HISTORY   No past medical history on file. SURGICAL HISTORY       Past Surgical History:   Procedure Laterality Date    TUBAL LIGATION           CURRENT MEDICATIONS       Discharge Medication List as of 10/13/2022  6:49 AM        CONTINUE these medications which have NOT CHANGED    Details   naproxen (NAPROSYN) 500 MG tablet Take 1 tablet by mouth 2 times daily (with meals), Disp-30 tablet, R-0Print      cyclobenzaprine (FLEXERIL) 10 MG tablet Take 1 tablet by mouth 3 times daily as needed for Muscle spasms, Disp-21 tablet, R-0Print             ALLERGIES     Bee venom    FAMILY HISTORY     No family history on file.        SOCIAL HISTORY       Social History     Socioeconomic History    Marital status: Single   Tobacco Use    Smoking status: Every Day     Packs/day: 0.50     Years: 20.00     Pack years: 10.00     Types: Cigarettes    Smokeless tobacco: Never   Substance and Sexual Activity    Alcohol use: Yes     Comment: occasional    Drug use: Yes     Types: Marijuana (Weed)     Social Determinants of Health     Financial Resource Strain: Low Risk     Difficulty of Paying Living Expenses: Not very hard   Food Insecurity: No Food Insecurity    Worried About Running Out of Food in the Last Year: Never true    Ran Out of Food in the Last Year: Never true       SCREENINGS    [place] Coma Scale  Eye Opening: Spontaneous  Best Verbal Response: Oriented  Best Motor Response: Obeys commands  Benedicto Coma Scale Score: 15        PHYSICAL EXAM    (up to 7 for level 4, 8 or more for level 5)     ED Triage Vitals [10/13/22 0610]   BP Temp Temp Source Heart Rate Resp SpO2 Height Weight   (!) 142/90 98.1 °F (36.7 °C) Oral 72 18 97 % 5' 3\" (1.6 m) 147 lb (66.7 kg)       Physical Exam  Vitals and nursing note reviewed. Constitutional:       General: She is not in acute distress. Appearance: Normal appearance. She is well-developed. She is not diaphoretic. HENT:      Head: Normocephalic and atraumatic. Nose: Nose normal.      Mouth/Throat:      Mouth: Mucous membranes are moist.   Eyes:      General: Lids are normal.      Conjunctiva/sclera: Conjunctivae normal.   Cardiovascular:      Rate and Rhythm: Normal rate and regular rhythm. Pulses: Normal pulses. Heart sounds: Normal heart sounds. Pulmonary:      Effort: Pulmonary effort is normal.      Breath sounds: Normal breath sounds. Abdominal:      General: Bowel sounds are normal.      Palpations: Abdomen is soft. Tenderness: There is no abdominal tenderness. Musculoskeletal:         General: Normal range of motion. Cervical back: Normal range of motion and neck supple.    Lymphadenopathy:      Cervical: No cervical adenopathy. Skin:     General: Skin is warm and dry. Capillary Refill: Capillary refill takes less than 2 seconds. Findings: No rash. Neurological:      Mental Status: She is alert and oriented to person, place, and time. Psychiatric:         Thought Content: Thought content normal.         Judgment: Judgment normal.         All other labs were within normal range or not returned as of this dictation. EMERGENCY DEPARTMENT COURSE and DIFFERENTIALDIAGNOSIS/MDM:   Vitals:    Vitals:    10/13/22 0610   BP: (!) 142/90   Pulse: 72   Resp: 18   Temp: 98.1 °F (36.7 °C)   TempSrc: Oral   SpO2: 97%   Weight: 147 lb (66.7 kg)   Height: 5' 3\" (1.6 m)            Patient presents with 2-day history of diarrheal illness. Nonacute abdomen on examination. Patient was offered stool culture work-up but declined at this time. She is treated with Zofran and Imodium. Recommending bland diet as she was feeling better yesterday then ate [employer] last night for dinner. We will treat as a diarrheal illness but if her symptoms are to worsen or continue she is to return to the emergency department or her family doctor for further work-up and management. Patient verbalized understanding. Patient stable for discharge. PROCEDURES:  Unless otherwise noted below, none     Procedures      FINAL IMPRESSION      1.  Diarrhea, unspecified type          DISPOSITION/PLAN   DISPOSITION Decision To Discharge 10/13/2022 06:43:14 AM          Jemima Montenegro (electronically signed)  Attending Emergency Physician       Nancye Castleman, PA-C  10/13/22 6338

## 2023-07-07 ENCOUNTER — HOSPITAL ENCOUNTER (EMERGENCY)
Age: 48
Discharge: HOME | End: 2023-07-07

## 2023-07-07 VITALS
HEART RATE: 68 BPM | DIASTOLIC BLOOD PRESSURE: 80 MMHG | WEIGHT: 158 LBS | TEMPERATURE: 98.3 F | HEIGHT: 63 IN | OXYGEN SATURATION: 99 % | RESPIRATION RATE: 16 BRPM | BODY MASS INDEX: 28 KG/M2 | SYSTOLIC BLOOD PRESSURE: 157 MMHG

## 2023-07-07 DIAGNOSIS — J01.81 OTHER ACUTE RECURRENT SINUSITIS: Primary | ICD-10-CM

## 2023-07-07 PROCEDURE — 99283 EMERGENCY DEPT VISIT LOW MDM: CPT

## 2023-07-07 RX ORDER — AMOXICILLIN AND CLAVULANATE POTASSIUM 875; 125 MG/1; MG/1
1 TABLET, FILM COATED ORAL 2 TIMES DAILY
Qty: 14 TABLET | Refills: 0 | Status: SHIPPED | OUTPATIENT
Start: 2023-07-07 | End: 2023-07-14

## 2023-07-07 ASSESSMENT — ENCOUNTER SYMPTOMS
ABDOMINAL PAIN: 0
TROUBLE SWALLOWING: 0
SINUS PAIN: 1
VOMITING: 0
CHOKING: 0
NAUSEA: 0
DIARRHEA: 0
RHINORRHEA: 0
SORE THROAT: 0
SHORTNESS OF BREATH: 0
COUGH: 0
EYES NEGATIVE: 1
ABDOMINAL DISTENTION: 0
SINUS PRESSURE: 1
VOICE CHANGE: 0

## 2023-07-07 ASSESSMENT — PAIN - FUNCTIONAL ASSESSMENT: PAIN_FUNCTIONAL_ASSESSMENT: 0-10

## 2023-07-07 NOTE — ED PROVIDER NOTES
[facility] ED  eMERGENCYdEPARTMENT eNCOUnter      Pt Name: Leonor Sims  MRN: [id number]  [address] [facility] [place] 1975  Date of evaluation: 7/7/2023  Nain iRggs PA-C    CHIEF COMPLAINT           HPI  Leonor Sims is a 41 y.o. female per chart review has a h/o allergies presents to the ED with c/o left sided sinus pressure, pain, congestion x 1 week. States when she gets allergies every year they usually turn into a painful sinus infection, requesting ABX. Takes OTC allergy meds daily. She denies any cough, sob, cp , fever ,n/v    ROS  Review of Systems   Constitutional:  Negative for chills, fatigue and fever. HENT:  Positive for congestion, sinus pressure and sinus pain. Negative for rhinorrhea, sneezing, sore throat, trouble swallowing and voice change. Eyes: Negative. Respiratory:  Negative for cough, choking and shortness of breath. Cardiovascular:  Negative for chest pain. Gastrointestinal:  Negative for abdominal distention, abdominal pain, diarrhea, nausea and vomiting. Endocrine: Negative. Genitourinary:  Negative for dysuria, flank pain, hematuria and urgency. Musculoskeletal: Negative. Neurological: Negative. Except as noted above the remainder of the review of systems was reviewed and negative. PAST MEDICAL HISTORY   History reviewed. No pertinent past medical history. SURGICAL HISTORY       Past Surgical History:   Procedure Laterality Date    TUBAL LIGATION           CURRENTMEDICATIONS       Previous Medications    CYCLOBENZAPRINE (FLEXERIL) 10 MG TABLET    Take 1 tablet by mouth 3 times daily as needed for Muscle spasms    NAPROXEN (NAPROSYN) 500 MG TABLET    Take 1 tablet by mouth 2 times daily (with meals)    ONDANSETRON (ZOFRAN ODT) 4 MG DISINTEGRATING TABLET    Take 1-2 tablets by mouth every 12 hours as needed for Nausea May Sub regular tablet (non-ODT) if insurance does not cover ODT.        ALLERGIES     Bee venom    FAMILY HISTORY